# Patient Record
(demographics unavailable — no encounter records)

---

## 2025-01-21 NOTE — HISTORY OF PRESENT ILLNESS
[Patient reported PAP Smear was normal] : Patient reported PAP Smear was normal [N] : Patient does not use contraception [Y] : Patient is sexually active [TextBox_4] : GYNHX No history of fibroids, cysts,   h/o HPV h/o gardasil  [PapSmeardate] : 2022 [LMPDate] : 11- [PGHxTotal] : 2 [[facility]] : 1 [[facility] Center[facility]] : 1 [PGHxABSpont] : 1 [FreeTextEntry1] :  boy

## 2025-01-21 NOTE — DISCUSSION/SUMMARY
[FreeTextEntry1] : 33Y old P1 011 annual GYN, follow-up missed AB, h/o HPV  Pap HPV preNatal vitamins Preconception counseling

## 2025-01-21 NOTE — PHYSICAL EXAM
[Chaperone Declined] : Patient declined chaperone [Appropriately responsive] : appropriately responsive [Alert] : alert [No Acute Distress] : no acute distress [No Lymphadenopathy] : no lymphadenopathy [Soft] : soft [Non-tender] : non-tender [Non-distended] : non-distended [No HSM] : No HSM [No Lesions] : no lesions [No Mass] : no mass [Oriented x3] : oriented x3 [Examination Of The Breasts] : a normal appearance [No Discharge] : no discharge [No Masses] : no breast masses were palpable [Labia Majora] : normal [Labia Minora] : normal [Normal] : normal [Enlarged ___ wks] : enlarged [unfilled] ~Uweeks [Uterine Adnexae] : normal

## 2025-03-26 NOTE — PLAN
[FreeTextEntry1] : 34 yo p1 /p laparoscopic right salpingectomy for ectopic doing well f/up as needed

## 2025-03-26 NOTE — HISTORY OF PRESENT ILLNESS
[Vaginal Discharge] : no vaginal discharge [Clean/Dry/Intact] : clean, dry and intact [Erythema] : not erythematous [Swelling] : not swollen [Healed] : healed [Discharge] : absent of discharge [None] : no vaginal bleeding [Pathology reviewed] : pathology reviewed [de-identified] : Lparosocpic right salpingectomy , inclusion cyst excision [de-identified] : right ectopic  [de-identified] : 34 yo p1 s/p Laparoscopic right salpingectomy lysis of adhesions done for ectopic pregnancy and inclusion periumbilical cyst excisoon for post op doing well     [de-identified] : no pain [de-identified] : abdomen soft nd nt

## 2025-03-26 NOTE — HISTORY OF PRESENT ILLNESS
[Vaginal Discharge] : no vaginal discharge [Clean/Dry/Intact] : clean, dry and intact [Erythema] : not erythematous [Swelling] : not swollen [Healed] : healed [Discharge] : absent of discharge [None] : no vaginal bleeding [Pathology reviewed] : pathology reviewed [de-identified] : Lparosocpic right salpingectomy , inclusion cyst excision [de-identified] : right ectopic  [de-identified] : 34 yo p1 s/p Laparoscopic right salpingectomy lysis of adhesions done for ectopic pregnancy and inclusion periumbilical cyst excisoon for post op doing well     [de-identified] : no pain [de-identified] : abdomen soft nd nt